# Patient Record
Sex: MALE | Race: WHITE | NOT HISPANIC OR LATINO | Employment: PART TIME | URBAN - METROPOLITAN AREA
[De-identification: names, ages, dates, MRNs, and addresses within clinical notes are randomized per-mention and may not be internally consistent; named-entity substitution may affect disease eponyms.]

---

## 2018-07-06 ENCOUNTER — HOSPITAL ENCOUNTER (EMERGENCY)
Facility: HOSPITAL | Age: 43
Discharge: HOME | End: 2018-07-06
Attending: FAMILY MEDICINE

## 2018-07-06 VITALS
DIASTOLIC BLOOD PRESSURE: 108 MMHG | BODY MASS INDEX: 20.3 KG/M2 | WEIGHT: 145 LBS | HEART RATE: 90 BPM | TEMPERATURE: 98 F | HEIGHT: 71 IN | RESPIRATION RATE: 18 BRPM | SYSTOLIC BLOOD PRESSURE: 143 MMHG | OXYGEN SATURATION: 98 %

## 2018-07-06 DIAGNOSIS — Z48.02 VISIT FOR SUTURE REMOVAL: Primary | ICD-10-CM

## 2018-07-06 PROCEDURE — 25000003 PHARM REV CODE 250: Performed by: FAMILY MEDICINE

## 2018-07-06 PROCEDURE — 99283 EMERGENCY DEPT VISIT LOW MDM: CPT

## 2018-07-06 RX ORDER — OXYCODONE AND ACETAMINOPHEN 5; 325 MG/1; MG/1
2 TABLET ORAL EVERY 4 HOURS PRN
COMMUNITY
End: 2022-02-09

## 2018-07-06 RX ORDER — ONDANSETRON 4 MG/1
4 TABLET, FILM COATED ORAL EVERY 6 HOURS PRN
COMMUNITY
End: 2019-02-05

## 2018-07-06 RX ORDER — ACETAMINOPHEN 325 MG/1
650 TABLET ORAL
Status: COMPLETED | OUTPATIENT
Start: 2018-07-06 | End: 2018-07-06

## 2018-07-06 RX ORDER — CEPHALEXIN 500 MG/1
500 CAPSULE ORAL EVERY 6 HOURS
COMMUNITY
End: 2019-02-05

## 2018-07-06 RX ADMIN — ACETAMINOPHEN 650 MG: 325 TABLET ORAL at 10:07

## 2018-07-06 NOTE — ED PROVIDER NOTES
Encounter Date: 7/6/2018       History     Chief Complaint   Patient presents with    Suture / Staple Removal     right index finger, stitches put in about 2 weeks ago by [facility] in [place], [place].      43-year-old man here for suture removal.  Sutures have been in place in his left index finger for 2 weeks.  He is currently on p.o. antibiotics.  The wound appears to have healed well and has no evidence of infection or dehiscence.          Review of patient's allergies indicates:  No Known Allergies  History reviewed. No pertinent past medical history.  History reviewed. No pertinent surgical history.  No family history on file.  Social History   Substance Use Topics    Smoking status: Former Smoker     Packs/day: 0.50     Years: 20.00     Types: Cigarettes    Smokeless tobacco: Never Used    Alcohol use Yes      Comment: occasional      Review of Systems   Constitutional: Negative for chills, fatigue and fever.   HENT: Negative for congestion, ear pain, rhinorrhea, sinus pain, sinus pressure and sore throat.    Eyes: Negative for photophobia and redness.   Respiratory: Negative for cough, shortness of breath and wheezing.    Cardiovascular: Negative for chest pain, palpitations and leg swelling.   Gastrointestinal: Negative for abdominal pain, constipation, diarrhea and nausea.   Endocrine: Negative for polydipsia, polyphagia and polyuria.   Genitourinary: Negative for difficulty urinating, dysuria, flank pain, hematuria and urgency.   Musculoskeletal: Negative for arthralgias, back pain and joint swelling.   Skin: Negative for color change.   Neurological: Negative for dizziness, seizures, syncope, weakness and headaches.   Hematological: Negative for adenopathy.   Psychiatric/Behavioral: Negative for sleep disturbance and suicidal ideas.   All other systems reviewed and are negative.      Physical Exam     Initial Vitals [07/06/18 1012]   BP Pulse Resp Temp SpO2   (!) 143/108 90 18 97.9 °F (36.6 °C) 98 %       MAP       --         Physical Exam    Nursing note and vitals reviewed.  Constitutional: He appears well-developed and well-nourished.   HENT:   Head: Normocephalic and atraumatic.   Cardiovascular: Normal rate and regular rhythm.   Pulmonary/Chest: Breath sounds normal.   Musculoskeletal: Normal range of motion.   Neurological: He is alert and oriented to person, place, and time.   Skin:   Wound on tip of left index finger appears to be healing well.         ED Course   Procedures  Labs Reviewed - No data to display       Imaging Results    None                               Clinical Impression:   The encounter diagnosis was Visit for suture removal.                             Morelia Caro MD  07/06/18 [id number]

## 2019-02-05 ENCOUNTER — HOSPITAL ENCOUNTER (EMERGENCY)
Facility: HOSPITAL | Age: 44
Discharge: HOME | End: 2019-02-05
Attending: INTERNAL MEDICINE

## 2019-02-05 VITALS
HEIGHT: 71 IN | SYSTOLIC BLOOD PRESSURE: 109 MMHG | TEMPERATURE: 99 F | BODY MASS INDEX: 21.7 KG/M2 | OXYGEN SATURATION: 99 % | HEART RATE: 74 BPM | WEIGHT: 155 LBS | RESPIRATION RATE: 18 BRPM | DIASTOLIC BLOOD PRESSURE: 84 MMHG

## 2019-02-05 DIAGNOSIS — J20.9 ACUTE BRONCHITIS, UNSPECIFIED ORGANISM: ICD-10-CM

## 2019-02-05 DIAGNOSIS — R09.1 PLEURISY: Primary | ICD-10-CM

## 2019-02-05 DIAGNOSIS — J11.1 INFLUENZA: ICD-10-CM

## 2019-02-05 LAB
INFLUENZA A, MOLECULAR: NEGATIVE
INFLUENZA B, MOLECULAR: NEGATIVE
SPECIMEN SOURCE: NORMAL

## 2019-02-05 PROCEDURE — 71045 X-RAY EXAM CHEST 1 VIEW: CPT | Mod: 26,,, | Performed by: RADIOLOGY

## 2019-02-05 PROCEDURE — 87502 INFLUENZA DNA AMP PROBE: CPT

## 2019-02-05 PROCEDURE — 71045 X-RAY EXAM CHEST 1 VIEW: CPT | Mod: TC,FY

## 2019-02-05 PROCEDURE — 99284 EMERGENCY DEPT VISIT MOD MDM: CPT | Mod: 25

## 2019-02-05 PROCEDURE — 71045 XR CHEST AP PORTABLE: ICD-10-PCS | Mod: 26,,, | Performed by: RADIOLOGY

## 2019-02-05 PROCEDURE — 96372 THER/PROPH/DIAG INJ SC/IM: CPT

## 2019-02-05 PROCEDURE — 63600175 PHARM REV CODE 636 W HCPCS: Performed by: INTERNAL MEDICINE

## 2019-02-05 RX ORDER — METHYLPREDNISOLONE 4 MG/1
TABLET ORAL
Qty: 1 PACKAGE | Refills: 0 | Status: SHIPPED | OUTPATIENT
Start: 2019-02-05 | End: 2019-02-26

## 2019-02-05 RX ORDER — AZITHROMYCIN 1 G/1
1 POWDER, FOR SUSPENSION ORAL ONCE
Qty: 1 PACKET | Refills: 0 | Status: SHIPPED | OUTPATIENT
Start: 2019-02-05 | End: 2019-02-05

## 2019-02-05 RX ORDER — KETOROLAC TROMETHAMINE 30 MG/ML
60 INJECTION, SOLUTION INTRAMUSCULAR; INTRAVENOUS
Status: COMPLETED | OUTPATIENT
Start: 2019-02-05 | End: 2019-02-05

## 2019-02-05 RX ORDER — DEXAMETHASONE SODIUM PHOSPHATE 100 MG/10ML
10 INJECTION INTRAMUSCULAR; INTRAVENOUS
Status: COMPLETED | OUTPATIENT
Start: 2019-02-05 | End: 2019-02-05

## 2019-02-05 RX ADMIN — KETOROLAC TROMETHAMINE 60 MG: 30 INJECTION, SOLUTION INTRAMUSCULAR; INTRAVENOUS at 12:02

## 2019-02-05 RX ADMIN — DEXAMETHASONE SODIUM PHOSPHATE 10 MG: 10 INJECTION INTRAMUSCULAR; INTRAVENOUS at 12:02

## 2019-02-05 NOTE — ED PROVIDER NOTES
Encounter Date: 2/5/2019       History   No chief complaint on file.    Patient presents with five-day history of upper respiratory infection with sore throat fever coughed chills headache and myalgias.  He will be improved over the 2 day period of time but then relapsed today with pleuritic anterior chest pain.  Still coughing.  Signs and symptoms suggesting flu with a pleuritic component.          Review of patient's allergies indicates:  No Known Allergies  History reviewed. No pertinent past medical history.  History reviewed. No pertinent surgical history.  No family history on file.  Social History     Tobacco Use    Smoking status: Current Every Day Smoker     Packs/day: 0.50     Years: 20.00     Pack years: 10.00     Types: Cigarettes    Smokeless tobacco: Never Used   Substance Use Topics    Alcohol use: No     Frequency: Never     Comment: occasional     Drug use: Yes     Types: Marijuana     Review of Systems   Constitutional: Positive for chills and fatigue. Negative for fever.   HENT: Positive for postnasal drip, rhinorrhea and sore throat.    Respiratory: Negative for shortness of breath.    Cardiovascular: Positive for chest pain.   Gastrointestinal: Negative for nausea.   Genitourinary: Negative for dysuria.   Musculoskeletal: Negative for back pain.   Skin: Negative for rash.   Neurological: Negative for weakness.   Hematological: Does not bruise/bleed easily.   All other systems reviewed and are negative.      Physical Exam     Initial Vitals [02/05/19 1114]   BP Pulse Resp Temp SpO2   109/84 74 18 98.7 °F (37.1 °C) 99 %      MAP       --         Physical Exam    Nursing note and vitals reviewed.  Constitutional: Vital signs are normal. He appears well-developed and well-nourished. He is active and cooperative.   HENT:   Head: Normocephalic and atraumatic.   Right Ear: External ear normal.   Left Ear: External ear normal.   Nose: Nose normal.   Mouth/Throat: Oropharynx is clear and moist.    Clear nasal discharge   Eyes: Conjunctivae, EOM and lids are normal. Pupils are equal, round, and reactive to light. Lids are everted and swept, no foreign bodies found.   Neck: Trachea normal, normal range of motion and full passive range of motion without pain. Neck supple.   Cardiovascular: Normal rate, regular rhythm, S1 normal, S2 normal, normal heart sounds, intact distal pulses and normal pulses.  No extrasystoles are present.    Pulmonary/Chest:   Right anterior pleuritic friction rub on respiration.   Abdominal: Soft. Normal appearance and bowel sounds are normal.   Musculoskeletal: Normal range of motion.   Neurological: He is alert. He has normal reflexes. GCS eye subscore is 4. GCS verbal subscore is 5. GCS motor subscore is 6.   Skin: Skin is warm, dry and intact. Capillary refill takes less than 2 seconds.   Psychiatric: He has a normal mood and affect. His speech is normal and behavior is normal. Cognition and memory are normal.         ED Course   Procedures  Labs Reviewed   INFLUENZA A & B BY MOLECULAR     EKG Readings: (Independently Interpreted)   Initial Reading: No STEMI. Rhythm: Normal Sinus Rhythm. Ectopy: No Ectopy. Conduction: Normal. ST Segments: Normal ST Segments. T Waves: Normal. Axis: Normal. Clinical Impression: Normal Sinus Rhythm       Imaging Results          X-Ray Chest AP Portable (Final result)  Result time 02/05/19 12:07:45    Final result by Tip Clements MD (02/05/19 12:07:45)                 Impression:      Mild pulmonary hyperinflation without focal consolidation.      Electronically signed by: Tip Clements  Date:    02/05/2019  Time:    12:07             Narrative:    EXAMINATION:  XR CHEST AP PORTABLE    CLINICAL HISTORY:  cough;.    TECHNIQUE:  Single frontal portable view of the chest was performed.    COMPARISON:  None    FINDINGS:  Support devices: None    Mild pulmonary hyperinflation.The lungs are clear, with normal appearance of pulmonary vasculature and  no pleural effusion or pneumothorax.    The cardiac silhouette is normal in size. The hilar and mediastinal contours are unremarkable.    Bones are intact.                              X-Rays:   Independently Interpreted Readings:   Chest X-Ray: Normal heart size.  No infiltrates.  No acute abnormalities. Normal vessels.     Medical Decision Making:   Clinical Tests:   Radiological Study: Ordered and Reviewed  ED Management:  X-ray is negative for pneumonia or other causes of chest pain. Findings compatible with pleuritic chest pain compatible with influenza.  Is given a prescription for Zofran lose a and Medrol Dosepak.  He is given Toradol and Decadron.                      Clinical Impression:   The primary encounter diagnosis was Pleurisy. Diagnoses of Influenza and Acute bronchitis, unspecified organism were also pertinent to this visit.      Disposition:   Disposition: Discharged  Condition: Stable                        Vikash Soto MD  02/05/19 1434       Vikash Soto MD  02/05/19 1006

## 2019-02-05 NOTE — ED NOTES
COUGH FOR PAST FEW DAYS, NAUSEA ONSET TODAY WITH HEADACHE, PRESSURE TO CHEST WORSE TODAY, MISSED 3-4 DAYS OF WORK, REPORTS PAIN IMPROVED WITH PRESSURE. MYRTLE NEWTON RN

## 2023-04-02 ENCOUNTER — HOSPITAL ENCOUNTER (EMERGENCY)
Age: 48
Discharge: HOME | End: 2023-04-02
Payer: MEDICAID

## 2023-04-02 ENCOUNTER — APPOINTMENT (OUTPATIENT)
Dept: GENERAL RADIOLOGY | Age: 48
End: 2023-04-02
Payer: MEDICAID

## 2023-04-02 VITALS
TEMPERATURE: 98.2 F | OXYGEN SATURATION: 98 % | SYSTOLIC BLOOD PRESSURE: 148 MMHG | DIASTOLIC BLOOD PRESSURE: 100 MMHG | WEIGHT: 148 LBS | RESPIRATION RATE: 18 BRPM | HEIGHT: 71 IN | HEART RATE: 86 BPM | BODY MASS INDEX: 20.72 KG/M2

## 2023-04-02 DIAGNOSIS — S66.901A INJURY OF EXTENSOR TENDON OF RIGHT HAND, INITIAL ENCOUNTER: Primary | ICD-10-CM

## 2023-04-02 PROCEDURE — 99283 EMERGENCY DEPT VISIT LOW MDM: CPT

## 2023-04-02 PROCEDURE — 6370000000 HC RX 637 (ALT 250 FOR IP): Performed by: PHYSICIAN ASSISTANT

## 2023-04-02 PROCEDURE — 73110 X-RAY EXAM OF WRIST: CPT

## 2023-04-02 PROCEDURE — 73130 X-RAY EXAM OF HAND: CPT

## 2023-04-02 RX ORDER — CYCLOBENZAPRINE HCL 10 MG
10 TABLET ORAL ONCE
Status: COMPLETED | OUTPATIENT
Start: 2023-04-02 | End: 2023-04-02

## 2023-04-02 RX ORDER — CYCLOBENZAPRINE HCL 10 MG
10 TABLET ORAL 3 TIMES DAILY PRN
Qty: 21 TABLET | Refills: 0 | Status: SHIPPED | OUTPATIENT
Start: 2023-04-02 | End: 2023-04-12

## 2023-04-02 RX ORDER — IBUPROFEN 600 MG/1
600 TABLET ORAL ONCE
Status: COMPLETED | OUTPATIENT
Start: 2023-04-02 | End: 2023-04-02

## 2023-04-02 RX ADMIN — IBUPROFEN 600 MG: 600 TABLET, FILM COATED ORAL at 18:30

## 2023-04-02 RX ADMIN — CYCLOBENZAPRINE 10 MG: 10 TABLET, FILM COATED ORAL at 18:30

## 2023-04-02 ASSESSMENT — LIFESTYLE VARIABLES
HOW MANY STANDARD DRINKS CONTAINING ALCOHOL DO YOU HAVE ON A TYPICAL DAY: PATIENT DOES NOT DRINK
HOW OFTEN DO YOU HAVE A DRINK CONTAINING ALCOHOL: NEVER

## 2023-04-02 ASSESSMENT — ENCOUNTER SYMPTOMS
NAUSEA: 0
VOMITING: 0
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
